# Patient Record
Sex: FEMALE | Race: ASIAN | NOT HISPANIC OR LATINO | Employment: UNEMPLOYED | URBAN - METROPOLITAN AREA
[De-identification: names, ages, dates, MRNs, and addresses within clinical notes are randomized per-mention and may not be internally consistent; named-entity substitution may affect disease eponyms.]

---

## 2021-04-06 ENCOUNTER — TELEPHONE ENCOUNTER (OUTPATIENT)
Dept: URBAN - METROPOLITAN AREA CLINIC 35 | Facility: CLINIC | Age: 31
End: 2021-04-06

## 2021-07-06 ENCOUNTER — TELEPHONE ENCOUNTER (OUTPATIENT)
Dept: URBAN - METROPOLITAN AREA CLINIC 35 | Facility: CLINIC | Age: 31
End: 2021-07-06

## 2021-07-07 ENCOUNTER — OFFICE VISIT (OUTPATIENT)
Dept: URBAN - METROPOLITAN AREA CLINIC 31 | Facility: CLINIC | Age: 31
End: 2021-07-07

## 2021-07-12 ENCOUNTER — OFFICE VISIT (OUTPATIENT)
Dept: URBAN - METROPOLITAN AREA CLINIC 33 | Facility: CLINIC | Age: 31
End: 2021-07-12

## 2021-07-12 VITALS
SYSTOLIC BLOOD PRESSURE: 112 MMHG | DIASTOLIC BLOOD PRESSURE: 84 MMHG | HEIGHT: 61 IN | HEART RATE: 111 BPM | BODY MASS INDEX: 26.06 KG/M2 | WEIGHT: 138 LBS | OXYGEN SATURATION: 100 %

## 2021-07-12 PROBLEM — 16331000 HEARTBURN: Status: ACTIVE | Noted: 2021-07-12

## 2021-07-12 PROBLEM — 40739000 DYSPHAGIA: Status: ACTIVE | Noted: 2021-07-12

## 2021-07-12 RX ORDER — OMEPRAZOLE 40 MG/1
1 CAPSULE CAPSULE, DELAYED RELEASE ORAL
Qty: 30 | Status: ACTIVE | COMMUNITY

## 2021-07-12 RX ORDER — FAMOTIDINE 20 MG/1
1 TABLET AT BEDTIME AS NEEDED TABLET, FILM COATED ORAL ONCE A DAY
Qty: 30 | Status: ACTIVE | COMMUNITY

## 2021-07-12 NOTE — HPI-MIGRATED HPI
;   ;     Change in bowel habits : Patient presents for change in bowel habits consult . Patient admits 2-3 bowel movements a day with alternating stools .  She admits having loose stols every 2 days . Pt admits symptoms began 2-3 months ago .   Denies taking recent antibiotics. Patient denies any recent stools studies . Patient denies blood in stools, melena , or mucus . Patient denies previous colonoscopy .;   Gastroesophageal Reflux :                                  30 year old female patient presents for heartburn consult. Patient states she has been experiencing symptoms for the past 2 months . Patient admits she is currently taking  prescribed Omeprazole 40mg and Famotidine 20mg  for relief of symptoms. she also admits taking OTC Mylanta at night .  Patient describes symptoms as choking sensation while lying down . Also throat pain and tightness . Patient admits  nighttime symptoms. Patient denies nausea or vomiting. Patient denies any associated weight loss. Patient admits EGD in 2018 completed in [place] revealing GERD per patient . ;

## 2021-07-12 NOTE — EXAM-MIGRATED EXAMINATIONS
GENERAL APPEARANCE: - alert, in no acute distress, well developed, well nourished;   THROAT: - clear;   NECK/THYROID: - neck supple, full range of motion, no cervical lymphadenopathy, no thyroid nodules, no thyromegaly, trachea midline;   HEART: - no murmurs, regular rate and rhythm, S1, S2 normal;   LUNGS: - clear to auscultation bilaterally, good air movement, no wheezes, rales, rhonchi;   ABDOMEN: - bowel sounds present, no masses palpable, no organomegaly , no rebound tenderness, soft, nontender, nondistended;

## 2021-07-20 ENCOUNTER — OFFICE VISIT (OUTPATIENT)
Dept: URBAN - METROPOLITAN AREA SURGERY CENTER 8 | Facility: SURGERY CENTER | Age: 31
End: 2021-07-20

## 2021-07-21 ENCOUNTER — OFFICE VISIT (OUTPATIENT)
Dept: URBAN - METROPOLITAN AREA CLINIC 35 | Facility: CLINIC | Age: 31
End: 2021-07-21

## 2021-08-02 ENCOUNTER — OFFICE VISIT (OUTPATIENT)
Dept: URBAN - METROPOLITAN AREA CLINIC 33 | Facility: CLINIC | Age: 31
End: 2021-08-02

## 2021-08-02 VITALS — WEIGHT: 138 LBS | HEIGHT: 61 IN | BODY MASS INDEX: 26.06 KG/M2

## 2021-08-02 RX ORDER — FAMOTIDINE 20 MG/1
1 TABLET AT BEDTIME AS NEEDED TABLET, FILM COATED ORAL ONCE A DAY
Qty: 30 TABLET | Refills: 2 | OUTPATIENT

## 2021-08-02 RX ORDER — OMEPRAZOLE 40 MG/1
1 CAPSULE 30 MINUTES BEFORE MORNING MEAL CAPSULE, DELAYED RELEASE ORAL ONCE A DAY
Qty: 30 | Refills: 2 | OUTPATIENT

## 2021-08-02 RX ORDER — OMEPRAZOLE 40 MG/1
1 CAPSULE CAPSULE, DELAYED RELEASE ORAL
Qty: 30 | Status: ACTIVE | COMMUNITY

## 2021-08-02 RX ORDER — FAMOTIDINE 20 MG/1
1 TABLET AT BEDTIME AS NEEDED TABLET, FILM COATED ORAL ONCE A DAY
Qty: 30 | Status: ACTIVE | COMMUNITY

## 2021-08-02 NOTE — HPI-MIGRATED HPI
;   ;     Change in bowel habits : Patient presents for follow up . She admits symptoms have subsided . Patient admits she is having 2 bowel movements a day. She admits normal stools . She admits having black tarry stools the day of the procedure only . She denies straining .     Of last visit (07/12/2021) Patient presents for change in bowel habits consult . Patient admits 2-3 bowel movements a day with alternating stools .  She admits having loose stols every 2 days . Pt admits symptoms began 2-3 months ago .   Denies taking recent antibiotics. Patient denies any recent stools studies . Patient denies blood in stools, melena , or mucus . Patient denies previous colonoscopy .;   Gastroesophageal Reflux : Patient presents for follow up of heartburn . She had EGD completed since last visit . She denies complications after procedure . She admits continuing Omeprazole 40mg and famotidine 20mg . She admits one breakthrough episode of globus feeling .    Of last visit (07/12/2021) 30 year old female patient presents for heartburn consult. Patient states she has been experiencing symptoms for the past 2 months . Patient admits she is currently taking  prescribed Omeprazole 40mg and Famotidine 20mg  for relief of symptoms. she also admits taking OTC Mylanta at night .  Patient describes symptoms as choking sensation while lying down . Also throat pain and tightness . Patient admits  nighttime symptoms. Patient denies nausea or vomiting. Patient denies any associated weight loss. Patient admits EGD in 2018 completed in [place] revealing GERD per patient .;

## 2021-08-03 ENCOUNTER — DASHBOARD ENCOUNTERS (OUTPATIENT)
Age: 31
End: 2021-08-03

## 2021-11-15 ENCOUNTER — OFFICE VISIT (OUTPATIENT)
Dept: URBAN - METROPOLITAN AREA CLINIC 33 | Facility: CLINIC | Age: 31
End: 2021-11-15